# Patient Record
Sex: MALE | Race: WHITE | Employment: STUDENT | URBAN - METROPOLITAN AREA
[De-identification: names, ages, dates, MRNs, and addresses within clinical notes are randomized per-mention and may not be internally consistent; named-entity substitution may affect disease eponyms.]

---

## 2024-10-28 ENCOUNTER — OFFICE VISIT (OUTPATIENT)
Dept: RHEUMATOLOGY | Facility: CLINIC | Age: 34
End: 2024-10-28
Payer: COMMERCIAL

## 2024-10-28 VITALS
DIASTOLIC BLOOD PRESSURE: 70 MMHG | TEMPERATURE: 99 F | HEIGHT: 71 IN | OXYGEN SATURATION: 99 % | BODY MASS INDEX: 30.94 KG/M2 | WEIGHT: 221 LBS | SYSTOLIC BLOOD PRESSURE: 124 MMHG | HEART RATE: 88 BPM

## 2024-10-28 DIAGNOSIS — E78.5 HYPERLIPIDEMIA LDL GOAL <100: ICD-10-CM

## 2024-10-28 DIAGNOSIS — K90.0 CELIAC DISEASE (HCC): Primary | ICD-10-CM

## 2024-10-28 DIAGNOSIS — M25.50 POLYARTHRALGIA: ICD-10-CM

## 2024-10-28 PROCEDURE — 99245 OFF/OP CONSLTJ NEW/EST HI 55: CPT | Performed by: INTERNAL MEDICINE

## 2024-10-28 PROCEDURE — 3008F BODY MASS INDEX DOCD: CPT | Performed by: INTERNAL MEDICINE

## 2024-10-28 PROCEDURE — 3074F SYST BP LT 130 MM HG: CPT | Performed by: INTERNAL MEDICINE

## 2024-10-28 PROCEDURE — 3078F DIAST BP <80 MM HG: CPT | Performed by: INTERNAL MEDICINE

## 2024-10-28 RX ORDER — TRAZODONE HYDROCHLORIDE 50 MG/1
50 TABLET, FILM COATED ORAL NIGHTLY
COMMUNITY

## 2024-10-28 RX ORDER — ANASTROZOLE 1 MG/1
1 TABLET ORAL WEEKLY
COMMUNITY
Start: 2023-05-04

## 2024-10-28 RX ORDER — CELECOXIB 200 MG/1
200 CAPSULE ORAL 2 TIMES DAILY PRN
Qty: 60 CAPSULE | Refills: 2 | Status: SHIPPED | OUTPATIENT
Start: 2024-10-28

## 2024-10-28 RX ORDER — ALBUTEROL SULFATE 90 UG/1
2 INHALANT RESPIRATORY (INHALATION) EVERY 4 HOURS PRN
COMMUNITY
Start: 2023-04-04

## 2024-10-28 RX ORDER — METOPROLOL SUCCINATE 25 MG/1
25 TABLET, EXTENDED RELEASE ORAL 2 TIMES DAILY
COMMUNITY
Start: 2023-03-13

## 2024-10-28 RX ORDER — TESTOSTERONE CYPIONATE 200 MG/ML
200 INJECTION, SOLUTION INTRAMUSCULAR WEEKLY
COMMUNITY
Start: 2023-05-02

## 2024-10-28 RX ORDER — DEXTROAMPHETAMINE SACCHARATE, AMPHETAMINE ASPARTATE MONOHYDRATE, DEXTROAMPHETAMINE SULFATE AND AMPHETAMINE SULFATE 7.5; 7.5; 7.5; 7.5 MG/1; MG/1; MG/1; MG/1
1 CAPSULE, EXTENDED RELEASE ORAL EVERY MORNING
COMMUNITY
Start: 2023-01-03

## 2024-10-28 NOTE — PROGRESS NOTES
[facility]  Report of Consultation    Rosales Rodrigues Patient Status:  No patient class for patient encounter    1990 MRN [id number]   Location [facility], [facility] PCP Shaka Mon DO     Date of Consult:  10/28/24    Reason for Consultation:   Chief Complaint   Patient presents with    Back Pain    Neck Pain       History of Present Illness: Rosales Rodrigues is a a(n) 34 year old male.   Last year diagnosed with celiac disease.  Dxed  of last year.  On gluten free diet since then   Labs checked recently still had a positive celiac antibody.  History of some bloating, constipation.  In 2019 that is when problems started, was on gluten free diet this past year.    Saw a gatroenterologist in [place],          C/o sharp painful joints at times.    Has had hand pain, ankles and toe pain also. Episodes of pain. .  Knees at times.  No obvious swelling.  Rings don't fit anymore.         Has dry eyes and dry mouth too.    Uses adderal for a long time for ADHD..    Nasal redness at times. .  Has had tendon issues too. . Had tendon issues in his feet 2 years ago.        Unable to work out, has tightness in the muscles of shoulders.           Was tested for Lupus - had a borderline YANETH.       History:  PMH:       Celiac disease.                   Asthma                   Hyperlipidemia  PSH:       Colonoscopy/ECG - gatritis seen  FAMHX:   Grandfather had thyroid disease.   Grandmother colitis.  Mother  age 42 from asthma.    SOCHX:   Alter-GnbUdex .  Self Employed [employer] .  Former smoker.   Weird reactions to alcohol.  Non drinker for a few years.  Rare tequila.   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      Allergies: Allergies[1]    Medications:   Current Outpatient Medications:     cholecalciferol 125 MCG (5000 UT) Oral Cap, Take 1 capsule (125 mcg total) by mouth daily., Disp: , Rfl:     metoprolol succinate ER 25 MG Oral Tablet 24 Hr, Take 1 tablet  (25 mg total) by mouth 2 (two) times daily., Disp: , Rfl:     testosterone cypionate 200 mg/mL Intramuscular Solution, Inject 1 mL (200 mg total) into the muscle once a week., Disp: , Rfl:     amphetamine-dextroamphetamine ER 30 MG Oral Capsule SR 24 Hr, Take 1 capsule (30 mg total) by mouth every morning., Disp: , Rfl:     albuterol 108 (90 Base) MCG/ACT Inhalation Aero Soln, Inhale 2 puffs into the lungs every 4 (four) hours as needed., Disp: , Rfl:     anastrozole 1 MG Oral Tab tab, Take 1 tablet (1 mg total) by mouth once a week., Disp: , Rfl:     celecoxib (CELEBREX) 200 MG Oral Cap, Take 1 capsule (200 mg total) by mouth 2 (two) times daily as needed for Pain., Disp: 60 capsule, Rfl: 2    traZODone 50 MG Oral Tab, Take 1 tablet (50 mg total) by mouth nightly., Disp: , Rfl:     Review of Systems:   No recent fever or chills.   September 2024 had joint pain and sob/tightness. Nasal flushing, shoulder tightness.    Has had lymph node swelling in the neck and axilla.     Joint pain at times.   Occasional back pain.  Has a slight back curvature.   No recent weight loss or weight gain.  No current chest pain, shortness of breath, or palpitations.  No current abdominal pain, nausea, diarrhea, or vomiting.  No difficulty with urination. No blood in the stool or blood in urine.  No current difficulty with vision or hearing.  Joint pain/joint swelling-see HPI. No muscle pain. No leg swelling.  No skin rashes presently.  .    Physical Exam:/70 (BP Location: Right arm, Patient Position: Sitting, Cuff Size: large)   Pulse 88   Temp 99 °F (37.2 °C)   Ht 5' 11\" (1.803 m)   Wt 221 lb (100.2 kg)   SpO2 99%   BMI 30.82 kg/m²    Normal appearing man in NAD.   HEENT exam within normal limits. Nonicteric sclera. Conjunctiva normal.    Neck supple without thyromegaly, no lymphadenopathy.   Lungs are clear to auscultation and percussion.    Heart: normal sinus rhythm without murmur.    Abdomen: soft, nontender, no  masses, no organomegaly.    Extremities without any cyanosis, clubbing, or edema.  Normal upper and lower extremities.  Skin: Within normal limits.    Laboratory Data: 24  CRP NW lab - <1.0  Sed rate  3  CBC  wbc 3.7 hemoglobin 16.0 hematocrit 50.3 platelet count 208,000.  Sodium 139 potassium 4.4 chloride 102 BUN 15 creatinine 1.06 GFR greater than 90 calcium 9.9 glucose 96 total protein 6.8 albumin 4.8 ALT 22 alkaline phos 62  AST 19 bilirubin 0.5.  Cindy-Barr virus panel negative.  Respiratory panel negative.  Adenovirus negative coronavirus negative      24  Hemoglobin A1C  5.4     24 Tissue transglutaminase IgG positive at 88.7, normal is less than 14.9.  Tissue transglutaminase IgA is normal at 3.7.    10/23/23  YANETH positive 1:80 diffuse  Double-stranded DNA negative KEL negative.  SCL 70 antibody negative Dilcia 1 antibody negative SSA antibody negative SSB antibody negative  RNP SM antibody negative testing from [facility].  Uric acid 4.7.       23 tissue transglutaminase antibody IgA elevated at greater than 250 normals less than 15.  Glia D peptide IgA 216 normals less than 15.    22 apolipoprotein B elevated at 114 normals less than 90.       Imagin2024 [facility]-CT enterogram abdominal pelvis with contrast.-No evidence of inflammatory bowel disease.  No acute abdominal or pelvic process.  Pelvis no pelvic adenopathy.  Bones-no convincing evidence of sacroiliitis.    Impression: 34-year-old man with intermittent joint pains.  Appear that the joint pain might be connected with celiac disease.  No sign of current autoimmune disease such as lupus or rheumatoid arthritis.      1. Celiac disease (HCC)    2. Hyperlipidemia LDL goal <100    3. Polyarthralgia        Recommendations:   Patient Instructions   Rosales you have definitely have celiac disease.  The etiology of your episodes of joint pain is unclear.  It could be related to celiac disease.  It could be related to stress.   Testing for other autoimmune processes like lupus or rheumatoid have been negative.  Your recent inflammation test sed rate and CRP were negative.  Your uric acid for gout test was normal.  My advice is to use occasionally ibuprofen 200 mg, 1-2 at a time 2-3 times a day if needed for joint pain.  Continue on your gluten-free diet.  Try to start a exercise program possibly just begin with 10 to 15 minutes of walking a day with some stretching exercises.  If you develop severe joint pain again and develop swelling in your joints then make a new appointment with me and I will reevaluate you in order some labs and x-rays.  Your current testing is not impressive for any particular autoimmune disease such as lupus or rheumatoid or one of the other connective tissue problems.  Return to office as needed.      Thank you for allowing me to participate in the care of your patient.    Og Woods MD  10/28/2024  11:29 AM         [1]   Allergies  Allergen Reactions    Alcohol SHORTNESS OF BREATH     Has not been formally tested but poor reaction (SOB) to beer    [employer] Coughing, ITCHING and WHEEZING

## 2024-10-29 PROBLEM — F90.8 ADHD, ADULT RESIDUAL TYPE: Status: ACTIVE | Noted: 2024-10-29

## 2024-10-29 PROBLEM — E78.5 HYPERLIPIDEMIA LDL GOAL <100: Status: ACTIVE | Noted: 2024-10-29

## 2024-10-29 PROBLEM — M25.50 POLYARTHRALGIA: Status: ACTIVE | Noted: 2024-10-29

## 2024-10-29 PROBLEM — J45.20 MILD INTERMITTENT ASTHMA WITHOUT COMPLICATION (HCC): Status: ACTIVE | Noted: 2024-10-29

## 2024-10-29 PROBLEM — K90.0 CELIAC DISEASE (HCC): Status: ACTIVE | Noted: 2024-10-29

## 2024-10-30 NOTE — PATIENT INSTRUCTIONS
Rosales you have definitely have celiac disease.  The etiology of your episodes of joint pain is unclear.  It could be related to celiac disease.  It could be related to stress.  Testing for other autoimmune processes like lupus or rheumatoid have been negative.  Your recent inflammation test sed rate and CRP were negative.  Your uric acid for gout test was normal.  My advice is to use occasionally ibuprofen 200 mg, 1-2 at a time 2-3 times a day if needed for joint pain.  Continue on your gluten-free diet.  Try to start a exercise program possibly just begin with 10 to 15 minutes of walking a day with some stretching exercises.  If you develop severe joint pain again and develop swelling in your joints then make a new appointment with me and I will reevaluate you in order some labs and x-rays.  Your current testing is not impressive for any particular autoimmune disease such as lupus or rheumatoid or one of the other connective tissue problems.  Return to office as needed.

## 2025-07-30 ENCOUNTER — PATIENT MESSAGE (OUTPATIENT)
Dept: RHEUMATOLOGY | Facility: CLINIC | Age: 35
End: 2025-07-30

## 2025-08-27 ENCOUNTER — TELEMEDICINE (OUTPATIENT)
Dept: RHEUMATOLOGY | Facility: CLINIC | Age: 35
End: 2025-08-27

## 2025-08-27 DIAGNOSIS — K90.0 CELIAC DISEASE (HCC): ICD-10-CM

## 2025-08-27 DIAGNOSIS — R76.8 POSITIVE ANA (ANTINUCLEAR ANTIBODY): ICD-10-CM

## 2025-08-27 DIAGNOSIS — M25.50 POLYARTHRALGIA: Primary | ICD-10-CM

## 2025-08-27 PROCEDURE — 98005 SYNCH AUDIO-VIDEO EST LOW 20: CPT | Performed by: INTERNAL MEDICINE

## 2025-08-27 RX ORDER — LISDEXAMFETAMINE DIMESYLATE 60 MG/1
60 CAPSULE ORAL EVERY MORNING
COMMUNITY

## 2025-08-27 RX ORDER — CELECOXIB 200 MG/1
200 CAPSULE ORAL 2 TIMES DAILY PRN
Qty: 60 CAPSULE | Refills: 2 | Status: CANCELLED
Start: 2025-08-27